# Patient Record
Sex: FEMALE | Race: BLACK OR AFRICAN AMERICAN | NOT HISPANIC OR LATINO | Employment: FULL TIME | URBAN - METROPOLITAN AREA
[De-identification: names, ages, dates, MRNs, and addresses within clinical notes are randomized per-mention and may not be internally consistent; named-entity substitution may affect disease eponyms.]

---

## 2023-06-06 ENCOUNTER — OFFICE VISIT (OUTPATIENT)
Dept: URGENT CARE | Facility: URGENT CARE | Age: 48
End: 2023-06-06
Payer: COMMERCIAL

## 2023-06-06 VITALS
SYSTOLIC BLOOD PRESSURE: 110 MMHG | HEART RATE: 87 BPM | DIASTOLIC BLOOD PRESSURE: 70 MMHG | HEIGHT: 66 IN | OXYGEN SATURATION: 98 % | WEIGHT: 140 LBS | TEMPERATURE: 98.1 F | RESPIRATION RATE: 16 BRPM | BODY MASS INDEX: 22.5 KG/M2

## 2023-06-06 DIAGNOSIS — B02.9 HERPES ZOSTER WITHOUT COMPLICATION: Primary | ICD-10-CM

## 2023-06-06 PROCEDURE — 99203 OFFICE O/P NEW LOW 30 MIN: CPT | Performed by: PHYSICIAN ASSISTANT

## 2023-06-06 RX ORDER — VALACYCLOVIR HYDROCHLORIDE 1 G/1
1000 TABLET, FILM COATED ORAL 3 TIMES DAILY
Qty: 21 TABLET | Refills: 0 | Status: SHIPPED | OUTPATIENT
Start: 2023-06-06 | End: 2023-06-13

## 2023-06-06 RX ORDER — HYDROCODONE BITARTRATE AND ACETAMINOPHEN 5; 325 MG/1; MG/1
1 TABLET ORAL EVERY 6 HOURS PRN
Qty: 15 TABLET | Refills: 0 | Status: SHIPPED | OUTPATIENT
Start: 2023-06-06 | End: 2023-06-09

## 2023-06-06 NOTE — PROGRESS NOTES
"Patient presents with:  Urgent Care  Derm Problem: Rash on right side near breast  and shoulder, x 3 days      (B02.9) Herpes zoster without complication  (primary encounter diagnosis)  Comment:   Plan: valACYclovir (VALTREX) 1000 mg tablet,         HYDROcodone-acetaminophen (NORCO) 5-325 MG         tablet            See handout on Shingles.      Potentially contagious only to those who have not yet had chickenpox and or pregnant women. Keep covered with clothing.       If not improving or if condition worsens, follow up with your Primary Care Provider            SUBJECTIVE:   Joaquina Chang is a 48 year old female who presents today with a painful rash on her right breast and right back onset about 3 days ago.      Denies any new topical products.  Rash does not itch.    Has been under increased stress recently.    Moved from [place] a year ago.  Used to live in [place]      Current Outpatient Medications   Medication Sig Dispense Refill     Multiple Vitamins-Iron (DAILY-ASHLEIGH/IRON/BETA-CAROTENE) TABS TAKE 1 TABLET BY MOUTH DAILY. (Patient not taking: Reported on 10/19/2020) 30 tablet 7     Social History     Tobacco Use     Smoking status: Never Smoker     Smokeless tobacco: Never Used   Substance Use Topics     Alcohol use: Not on file     Family History   Problem Relation Age of Onset     Diabetes Mother      Diabetes Father          ROS:    10 point ROS of systems including Constitutional, Eyes, Respiratory, Cardiovascular, Gastroenterology, Genitourinary, Integumentary, Muscularskeletal, Psychiatric ,neurological were all negative except for pertinent positives noted in my HPI       OBJECTIVE:  /70   Pulse 87   Temp 98.1  F (36.7  C) (Temporal)   Resp 16   Ht 1.676 m (5' 6\")   Wt 63.5 kg (140 lb)   SpO2 98%   BMI 22.60 kg/m    Physical Exam:  GENERAL APPEARANCE: healthy, alert and no distress  SKIN: grouped vesicular lesions on right breast and right upper lateral back in dermatomal " pattern.  No open sores or purulent drainage.

## 2023-06-06 NOTE — PATIENT INSTRUCTIONS
(B02.9) Herpes zoster without complication  (primary encounter diagnosis)  Comment:   Plan: valACYclovir (VALTREX) 1000 mg tablet,         HYDROcodone-acetaminophen (NORCO) 5-325 MG         tablet            See handout on Shingles.      Potentially contagious only to those who have not yet had chickenpox and or pregnant women. Keep covered with clothing.